# Patient Record
Sex: MALE | Race: WHITE | NOT HISPANIC OR LATINO | URBAN - NONMETROPOLITAN AREA
[De-identification: names, ages, dates, MRNs, and addresses within clinical notes are randomized per-mention and may not be internally consistent; named-entity substitution may affect disease eponyms.]

---

## 2018-11-09 ENCOUNTER — HOSPITAL ENCOUNTER (EMERGENCY)
Facility: HOSPITAL | Age: 48
Discharge: HOME | End: 2018-11-10
Attending: EMERGENCY MEDICINE | Admitting: EMERGENCY MEDICINE

## 2018-11-09 VITALS
DIASTOLIC BLOOD PRESSURE: 76 MMHG | OXYGEN SATURATION: 99 % | SYSTOLIC BLOOD PRESSURE: 121 MMHG | HEART RATE: 81 BPM | BODY MASS INDEX: 23.86 KG/M2 | RESPIRATION RATE: 18 BRPM | HEIGHT: 73 IN | WEIGHT: 180 LBS | TEMPERATURE: 98.4 F

## 2018-11-09 DIAGNOSIS — S61.411A LACERATION OF RIGHT PALM, INITIAL ENCOUNTER: Primary | ICD-10-CM

## 2018-11-09 PROCEDURE — 90715 TDAP VACCINE 7 YRS/> IM: CPT | Performed by: PHYSICIAN ASSISTANT

## 2018-11-09 PROCEDURE — 99282 EMERGENCY DEPT VISIT SF MDM: CPT

## 2018-11-09 PROCEDURE — 90471 IMMUNIZATION ADMIN: CPT | Performed by: PHYSICIAN ASSISTANT

## 2018-11-09 PROCEDURE — 25010000002 TDAP 5-2.5-18.5 LF-MCG/0.5 SUSPENSION: Performed by: PHYSICIAN ASSISTANT

## 2018-11-09 RX ADMIN — TETANUS TOXOID, REDUCED DIPHTHERIA TOXOID AND ACELLULAR PERTUSSIS VACCINE, ADSORBED 0.5 ML: 5; 2.5; 8; 8; 2.5 SUSPENSION INTRAMUSCULAR at 23:42

## 2018-11-10 NOTE — ED NOTES
CLEANED LACERATION WITH SURGICAL SCRUB.  PLACED GLUE IN ROOM FOR PROVIDER.     Zaina Han  11/09/18 [id number]

## 2018-11-10 NOTE — ED NOTES
Pt supervisor present requesting mandatory drug testing, related to work related injury.  Lab contacted.       Maria L Wong, RN  11/10/18 0010

## 2018-11-10 NOTE — ED PROVIDER NOTES
Subjective     Laceration   Location:  Hand  Hand laceration location:  R hand  Length:  1 cm   Depth:  Through dermis  Quality: straight    Bleeding: controlled    Time since incident:  1 hour  Laceration mechanism:  Broken glass (From light bulbs)  Pain details:     Quality:  Aching    Severity:  Mild    Timing:  Constant    Progression:  Unchanged  Foreign body present:  No foreign bodies  Relieved by:  None tried  Worsened by:  Movement and pressure  Ineffective treatments:  None tried  Tetanus status:  Out of date  Associated symptoms: no fever, no focal weakness, no numbness, no rash, no redness, no swelling and no streaking        Review of Systems   Constitutional: Negative.  Negative for fever.   HENT: Negative.    Respiratory: Negative.    Cardiovascular: Negative.  Negative for chest pain.   Gastrointestinal: Negative.  Negative for abdominal pain.   Endocrine: Negative.    Genitourinary: Negative.  Negative for dysuria.   Skin: Positive for wound. Negative for color change, pallor and rash.   Neurological: Negative.  Negative for focal weakness.   Psychiatric/Behavioral: Negative.    All other systems reviewed and are negative.      No past medical history on file.    Allergies   Allergen Reactions   • Tramadol Rash       No past surgical history on file.    No family history on file.    Social History     Social History   • Marital status: Unknown     Social History Main Topics   • Drug use: Unknown     Other Topics Concern   • Not on file           Objective   Physical Exam   Constitutional: He is oriented to person, place, and time. He appears well-developed and well-nourished. No distress.   HENT:   Head: Normocephalic and atraumatic.   Right Ear: External ear normal.   Left Ear: External ear normal.   Nose: Nose normal.   Eyes: Pupils are equal, round, and reactive to light. Conjunctivae and EOM are normal.   Neck: Normal range of motion. Neck supple. No JVD present. No tracheal deviation present.    Cardiovascular: Normal rate, regular rhythm and normal heart sounds.  Exam reveals no gallop and no friction rub.    No murmur heard.  Pulmonary/Chest: Effort normal and breath sounds normal. No respiratory distress. He has no wheezes. He has no rales. He exhibits no tenderness.   Abdominal: Soft. Bowel sounds are normal. He exhibits no distension and no mass. There is no tenderness. There is no rebound and no guarding. No hernia.   Musculoskeletal: Normal range of motion. He exhibits no edema, tenderness or deformity.   Full ROM RUE and neurovascular status and sensation RUE intact.    Neurological: He is alert and oriented to person, place, and time. He displays normal reflexes. No cranial nerve deficit or sensory deficit. He exhibits normal muscle tone. Coordination normal.   Skin: Skin is warm and dry. No rash noted. He is not diaphoretic. No erythema. No pallor.   1 cm laceration on the palmar aspect of right hand with bleeding controlled and no signs of infection.    Psychiatric: He has a normal mood and affect. His behavior is normal. Thought content normal.   Nursing note and vitals reviewed.      Laceration Repair  Date/Time: 11/9/2018 11:51 PM  Performed by: DONNIE RIVERA  Authorized by: DAVID LIVE     Consent:     Consent obtained:  Verbal    Consent given by:  Patient    Risks discussed:  Infection, need for additional repair, nerve damage, poor wound healing, poor cosmetic result, pain, retained foreign body, tendon damage and vascular damage    Alternatives discussed:  No treatment, delayed treatment, observation and referral  Anesthesia (see MAR for exact dosages):     Anesthesia method:  None  Laceration details:     Location:  Hand    Hand location:  R palm    Length (cm):  1    Depth (mm):  2  Repair type:     Repair type:  Simple  Pre-procedure details:     Preparation:  Patient was prepped and draped in usual sterile fashion  Exploration:     Hemostasis achieved with:  Direct  pressure    Wound exploration: wound explored through full range of motion and entire depth of wound probed and visualized      Wound extent: no areolar tissue violation noted, no fascia violation noted, no foreign bodies/material noted, no muscle damage noted, no nerve damage noted, no tendon damage noted, no underlying fracture noted and no vascular damage noted      Contaminated: no    Treatment:     Area cleansed with:  Hibiclens    Amount of cleaning:  Standard    Irrigation solution:  Sterile saline    Irrigation volume:  30 mL    Irrigation method:  Syringe    Visualized foreign bodies/material removed: no    Skin repair:     Repair method:  Tissue adhesive  Approximation:     Approximation:  Close    Vermilion border: well-aligned    Post-procedure details:     Dressing:  Non-adherent dressing    Patient tolerance of procedure:  Tolerated well, no immediate complications               ED Course  ED Course as of Nov 10 0004   Sat Nov 10, 2018   0002 Patient diagnosed with right hand laceration. Will be d/c home with wound care instructions. Will see PCP in 5 days for wound check. Will return to ER if symptoms worsen.   [MM]      ED Course User Index  [MM] Lani Cormier PA                  MDM  Number of Diagnoses or Management Options  Laceration of right palm, initial encounter:         Final diagnoses:   Laceration of right palm, initial encounter            Lani Cormier PA  11/10/18 0004

## 2018-11-10 NOTE — ED NOTES
Lab staff present for uds, pt unable to void at this time.  gatorade given.  Pt supervisor remains at bedside.       Maria L Wong, STEF  11/10/18 0011

## 2018-11-10 NOTE — DISCHARGE INSTRUCTIONS
Please keep wound clean, covered and dry. Please see one of the following PCPs in 5 days for wound check or return to ER if symptoms worsen.

## 2019-10-31 ENCOUNTER — HOSPITAL ENCOUNTER (OUTPATIENT)
Dept: GENERAL RADIOLOGY | Facility: HOSPITAL | Age: 49
Discharge: HOME | End: 2019-10-31

## 2019-10-31 ENCOUNTER — HOSPITAL ENCOUNTER (OUTPATIENT)
Dept: GENERAL RADIOLOGY | Facility: HOSPITAL | Age: 49
Discharge: HOME | End: 2019-10-31
Admitting: INTERNAL MEDICINE

## 2019-10-31 ENCOUNTER — TRANSCRIBE ORDERS (OUTPATIENT)
Dept: ADMINISTRATIVE | Facility: HOSPITAL | Age: 49
End: 2019-10-31

## 2019-10-31 DIAGNOSIS — M79.642 PAIN IN BOTH HANDS: Primary | ICD-10-CM

## 2019-10-31 DIAGNOSIS — M79.641 RIGHT HAND PAIN: ICD-10-CM

## 2019-10-31 DIAGNOSIS — M79.642 LEFT HAND PAIN: ICD-10-CM

## 2019-10-31 DIAGNOSIS — M79.641 PAIN IN BOTH HANDS: Primary | ICD-10-CM

## 2019-10-31 DIAGNOSIS — M54.2 CERVICALGIA: ICD-10-CM

## 2019-10-31 PROCEDURE — 73130 X-RAY EXAM OF HAND: CPT | Performed by: RADIOLOGY

## 2019-10-31 PROCEDURE — 73130 X-RAY EXAM OF HAND: CPT

## 2019-10-31 PROCEDURE — 72050 X-RAY EXAM NECK SPINE 4/5VWS: CPT

## 2019-10-31 PROCEDURE — 72050 X-RAY EXAM NECK SPINE 4/5VWS: CPT | Performed by: RADIOLOGY

## 2021-09-23 ENCOUNTER — HOSPITAL ENCOUNTER (EMERGENCY)
Dept: HOSPITAL 79 - ER1 | Age: 51
Discharge: HOME | End: 2021-09-23
Payer: COMMERCIAL

## 2021-09-23 DIAGNOSIS — N45.1: Primary | ICD-10-CM

## 2021-09-23 DIAGNOSIS — F17.210: ICD-10-CM

## 2021-09-23 LAB
BUN/CREATININE RATIO: 16 (ref 0–10)
HGB BLD-MCNC: 14.2 GM/DL (ref 14–17.5)
RED BLOOD COUNT: 4.47 M/UL (ref 4.2–5.5)
WHITE BLOOD COUNT: 8.3 K/UL (ref 4.5–11)

## 2025-04-21 ENCOUNTER — OFFICE VISIT (OUTPATIENT)
Dept: UROLOGY | Facility: CLINIC | Age: 55
End: 2025-04-21
Payer: COMMERCIAL

## 2025-04-21 VITALS
BODY MASS INDEX: 21.44 KG/M2 | WEIGHT: 161.8 LBS | SYSTOLIC BLOOD PRESSURE: 146 MMHG | HEIGHT: 73 IN | HEART RATE: 103 BPM | DIASTOLIC BLOOD PRESSURE: 101 MMHG

## 2025-04-21 DIAGNOSIS — N45.1 EPIDIDYMITIS: Primary | ICD-10-CM

## 2025-04-21 RX ORDER — SULFAMETHOXAZOLE AND TRIMETHOPRIM 800; 160 MG/1; MG/1
1 TABLET ORAL 2 TIMES DAILY
Qty: 84 TABLET | Refills: 2 | Status: SHIPPED | OUTPATIENT
Start: 2025-04-21

## 2025-04-21 RX ORDER — HYDROCODONE BITARTRATE AND ACETAMINOPHEN 10; 325 MG/1; MG/1
1 TABLET ORAL EVERY 6 HOURS PRN
Qty: 10 TABLET | Refills: 0 | Status: SHIPPED | OUTPATIENT
Start: 2025-04-21

## 2025-04-21 NOTE — PROGRESS NOTES
Chief Complaint:      Chief Complaint   Patient presents with    Hydrocele      New patient        HPI:   54 y.o. male new patient referred with left testicular pain.  He was seen by nurse practitioner in February of this year he had been incarcerated and had a misshapen testicle for 7 months.  Despite denying STDs he had a full panel he had an ultrasound showing a left epididymitis and a varicocele.  He had no evidence of torsion.  Examination today disclosed a left grade 1 epididymitis.  I am going to recommend antibiotics elevation etc. and a follow-up in 3 weeks.  Per his request I went ahead and gave him a course of pain medication as well    Past Medical History:     Past Medical History:   Diagnosis Date    Ulcer of abdomen wall        Current Meds:     No current outpatient medications on file.     No current facility-administered medications for this visit.        Allergies:      Allergies   Allergen Reactions    Tramadol Rash        Past Surgical History:     Past Surgical History:   Procedure Laterality Date    HERNIA REPAIR      STOMACH SURGERY         Social History:     Social History     Socioeconomic History    Marital status: Single   Tobacco Use    Smoking status: Every Day     Types: Cigarettes    Smokeless tobacco: Never       Family History:     Family History   Problem Relation Age of Onset    No Known Problems Father     No Known Problems Mother        Review of Systems:     Review of Systems   Constitutional: Negative.    HENT: Negative.     Eyes: Negative.    Respiratory: Negative.     Cardiovascular: Negative.    Gastrointestinal: Negative.    Endocrine: Negative.    Genitourinary:  Positive for scrotal swelling and testicular pain.   Musculoskeletal: Negative.    Allergic/Immunologic: Negative.    Neurological: Negative.    Hematological: Negative.    Psychiatric/Behavioral: Negative.         Physical Exam:     Physical Exam  Vitals and nursing note reviewed.   Constitutional:        Appearance: He is well-developed.   HENT:      Head: Normocephalic and atraumatic.   Eyes:      Conjunctiva/sclera: Conjunctivae normal.      Pupils: Pupils are equal, round, and reactive to light.   Cardiovascular:      Rate and Rhythm: Normal rate and regular rhythm.      Heart sounds: Normal heart sounds.   Pulmonary:      Effort: Pulmonary effort is normal.      Breath sounds: Normal breath sounds.   Abdominal:      General: Bowel sounds are normal.      Palpations: Abdomen is soft.   Genitourinary:     Comments: Grade 1 epididymitis and a subclinical varicocele  Musculoskeletal:         General: Normal range of motion.      Cervical back: Normal range of motion.   Skin:     General: Skin is warm and dry.   Neurological:      Mental Status: He is alert and oriented to person, place, and time.      Deep Tendon Reflexes: Reflexes are normal and symmetric.   Psychiatric:         Behavior: Behavior normal.         Thought Content: Thought content normal.         Judgment: Judgment normal.         I have reviewed the following portions of the patient's history: Allergies, current medications, past family history, past medical history, past social history, past surgical history, problem list, and ROS and confirm it is accurate.    Recent Image (CT and/or KUB):      CT Abdomen and Pelvis: No results found for this or any previous visit.       CT Stone Protocol: No results found for this or any previous visit.       KUB: No results found for this or any previous visit.       Labs (past 3 months):      No visits with results within 3 Month(s) from this visit.   Latest known visit with results is:   No results found for any previous visit.        Procedure:       Assessment/Plan:   Epididymitis-we discussed the etiology of epididymitis from lifting heavy objects to the full spectrum of epididymo-orchitis.  I discussed the staging and grading system including a stage I epididymitis meaning confined to the epididymis but in  a separation between the epididymis and testicle, grade 2 being a concretion of both layers where I can feel the difference, and finally grade 3 with scrotal fixation of the skin. We discussed the recommendation of warm soaks, elevation, and antibiotics where indicated.  I discussed the indication for aggressive intervention such as the presence of an abscess in the presence of significant systemic symptomatology such as fevers and chills.  We also discussed the fact that the thickening and swelling may persist beyond the pain and that sometimes a prolonged course of antibiotics may be indicated.  Finally, we discussed ultimately there may be significant atrophy of the testicle after the episode of epididymitis and its important to watch closely for that.  Start antibiotics recommend elevation etc.  Varicocele-discussed the pathophysiology of varicocele length.  We discussed the staging system of a grade 1 varicocele which is representative of palpable veins of the pampiniform plexus with Valsalva versus a grade 2 and finally grade 3 with testicular atrophy encompassing the entire left hemiscrotum.  I discussed the fact that these are usually on the left side.  We discussed the very rare association with malignancy with an acute process.  We discussed the treatment. I discussed the fact that 15% of men have these varicoceles and then 30% of men have infertility associated with it, but at this time is nothing would pursue until fertility becomes an issue.  Talked about scrotal elevation.  We talked about the surgical intervention which is a ligation of a varicocele versus the radiographic embolization with coils when it becomes severe or pain becomes problematic.  He has a subclinical asymptomatic varicocele  Narcotic pain medication-patient has significant acute pain that I believe would be an indication for the use of narcotic pain medication.  I discussed the significant risks of pain medication and the fact that  this will be a short only option and I will give her no more than a three-day supply of pain medication, I will not plan long-term medication, and that this will be sent to a pain clinic if it at all becomes necessary.  We discussed signing a pain medication agreement and the fact that we're going to run a state OMAR review to be sure the patient is not getting pain medication from elsewhere.  If this is the case, we will not give pain medication as part of the patient's treatment plan of there being prescribed a controlled substance with potential for abuse.  This patient has been well aware of the appropriate dose of such medications including the risks for somnolence, limited ability to drive and/or safety and the significant potential for overdose.  It has been made clear that these medications are for the prescribed patient only without concomitant use of alcohol or other substance unless prescribed by the medical provider.  Has completed prescribing agreement detailing the terms of continue prescribing him a controlled substance including monitoring Omar reports, the possibility of urine drug screens, and pill counts.  The patient is aware that we review OMAR reports on a regular basis and scan them into the chart.  History and physical examination exhibited continued safe and appropriate use of controlled substances. We also discussed the fact that the new [place] legislation allows only a three-day prescription for pain medication.  In this situation he will be referred to a chronic pain clinic.          This document has been electronically signed by MARELY LOBATO MD April 21, 2025 13:38 EDT    Dictated Utilizing Dragon Dictation: Part of this note may be an electronic transcription/translation of spoken language to printed text using the Dragon Dictation System.

## 2025-04-27 PROBLEM — N45.1 EPIDIDYMITIS: Status: ACTIVE | Noted: 2025-04-27

## 2025-05-13 ENCOUNTER — OFFICE VISIT (OUTPATIENT)
Dept: UROLOGY | Facility: CLINIC | Age: 55
End: 2025-05-13
Payer: COMMERCIAL

## 2025-05-13 VITALS
HEIGHT: 73 IN | BODY MASS INDEX: 21.39 KG/M2 | WEIGHT: 161.4 LBS | SYSTOLIC BLOOD PRESSURE: 148 MMHG | DIASTOLIC BLOOD PRESSURE: 100 MMHG | HEART RATE: 110 BPM

## 2025-05-13 DIAGNOSIS — N50.3 EPIDIDYMAL CYST: Primary | ICD-10-CM

## 2025-05-13 RX ORDER — PANTOPRAZOLE SODIUM 40 MG/1
TABLET, DELAYED RELEASE ORAL
COMMUNITY

## 2025-05-13 NOTE — PROGRESS NOTES
Chief Complaint:      Chief Complaint   Patient presents with    hydrocele        HPI:   54 y.o. male returns today has a hydrocele left globus major.  He is doing well.  It is a small 1 cm cyst again reassurance I will see him back on an as-needed basis.    Past Medical History:     Past Medical History:   Diagnosis Date    Ulcer of abdomen wall        Current Meds:     Current Outpatient Medications   Medication Sig Dispense Refill    HYDROcodone-acetaminophen (NORCO)  MG per tablet Take 1 tablet by mouth Every 6 (Six) Hours As Needed for Moderate Pain. 10 tablet 0    pantoprazole (PROTONIX) 40 MG EC tablet       sulfamethoxazole-trimethoprim (Bactrim DS) 800-160 MG per tablet Take 1 tablet by mouth 2 (Two) Times a Day. 84 tablet 2     No current facility-administered medications for this visit.        Allergies:      Allergies   Allergen Reactions    Tramadol Rash        Past Surgical History:     Past Surgical History:   Procedure Laterality Date    HERNIA REPAIR      STOMACH SURGERY         Social History:     Social History     Socioeconomic History    Marital status: Single   Tobacco Use    Smoking status: Every Day     Types: Cigarettes     Passive exposure: Current    Smokeless tobacco: Never   Vaping Use    Vaping status: Former   Substance and Sexual Activity    Alcohol use: Never    Drug use: Yes     Types: Marijuana    Sexual activity: Defer     Birth control/protection: Partner of same sex       Family History:     Family History   Problem Relation Age of Onset    No Known Problems Father     No Known Problems Mother        Review of Systems:     Review of Systems   Constitutional: Negative.    HENT: Negative.     Eyes: Negative.    Respiratory: Negative.     Cardiovascular: Negative.    Gastrointestinal: Negative.    Endocrine: Negative.    Musculoskeletal: Negative.    Allergic/Immunologic: Negative.    Neurological: Negative.    Hematological: Negative.    Psychiatric/Behavioral: Negative.          Physical Exam:     Physical Exam  Vitals and nursing note reviewed.   Constitutional:       Appearance: He is well-developed.   HENT:      Head: Normocephalic and atraumatic.   Eyes:      Conjunctiva/sclera: Conjunctivae normal.      Pupils: Pupils are equal, round, and reactive to light.   Cardiovascular:      Rate and Rhythm: Normal rate and regular rhythm.      Heart sounds: Normal heart sounds.   Pulmonary:      Effort: Pulmonary effort is normal.      Breath sounds: Normal breath sounds.   Abdominal:      General: Bowel sounds are normal.      Palpations: Abdomen is soft.   Genitourinary:     Comments: Epididymal cyst  Musculoskeletal:         General: Normal range of motion.      Cervical back: Normal range of motion.   Skin:     General: Skin is warm and dry.   Neurological:      Mental Status: He is alert and oriented to person, place, and time.      Deep Tendon Reflexes: Reflexes are normal and symmetric.   Psychiatric:         Behavior: Behavior normal.         Thought Content: Thought content normal.         Judgment: Judgment normal.         I have reviewed the following portions of the patient's history: Allergies, current medications, past family history, past medical history, past social history, past surgical history, problem list, and ROS and confirm it is accurate.    Recent Image (CT and/or KUB):      CT Abdomen and Pelvis: No results found for this or any previous visit.       CT Stone Protocol: No results found for this or any previous visit.       KUB: No results found for this or any previous visit.       Labs (past 3 months):      No visits with results within 3 Month(s) from this visit.   Latest known visit with results is:   No results found for any previous visit.        Procedure:       Assessment/Plan:   Epididymal cyst-recommend observation no further intervention required            This document has been electronically signed by MARELY LOBATO MD May 13, 2025 09:41  EDT    Dictated Utilizing Dragon Dictation: Part of this note may be an electronic transcription/translation of spoken language to printed text using the Dragon Dictation System.

## 2025-05-19 PROBLEM — N50.3 EPIDIDYMAL CYST: Status: ACTIVE | Noted: 2025-05-19
